# Patient Record
Sex: FEMALE | Race: AMERICAN INDIAN OR ALASKA NATIVE
[De-identification: names, ages, dates, MRNs, and addresses within clinical notes are randomized per-mention and may not be internally consistent; named-entity substitution may affect disease eponyms.]

---

## 2019-03-15 ENCOUNTER — HOSPITAL ENCOUNTER (EMERGENCY)
Dept: HOSPITAL 5 - ED | Age: 24
Discharge: HOME | End: 2019-03-15
Payer: COMMERCIAL

## 2019-03-15 VITALS — SYSTOLIC BLOOD PRESSURE: 136 MMHG | DIASTOLIC BLOOD PRESSURE: 71 MMHG

## 2019-03-15 DIAGNOSIS — M25.572: Primary | ICD-10-CM

## 2019-03-15 DIAGNOSIS — M25.472: ICD-10-CM

## 2019-03-15 PROCEDURE — 99283 EMERGENCY DEPT VISIT LOW MDM: CPT

## 2019-03-15 PROCEDURE — 96372 THER/PROPH/DIAG INJ SC/IM: CPT

## 2019-03-15 PROCEDURE — 73600 X-RAY EXAM OF ANKLE: CPT

## 2019-03-15 NOTE — XRAY REPORT
The left ankle:



Pain.



There is mild edema surrounding both medial and lateral portions of the 

ankle. There is no fracture and no displacement noted. The joint spaces 

are preserved. The bones are well-mineralized.



Impression:



Nonspecific swelling.

## 2019-03-15 NOTE — EMERGENCY DEPARTMENT REPORT
ED Lower Extremity HPI





- General


Chief Complaint: Extremity Problem,Nontraumatic


Stated Complaint: LEFT ANKLE PAIN


Time Seen by Provider: 03/15/19 10:44


Source: patient


Mode of arrival: Ambulatory


Limitations: No Limitations





- History of Present Illness


Initial Comments: 





24-year-old female with a past medical history of obesity and PCOS currently on 

metformin presents to the hospital with complains of left ankle pain and 

swelling 2 days.  Pain is swelling is at the lateral ankle, read as 7/10 

intensity, constant, worsened movement and palpation.  Patient denies any recent

fall or trauma.  She has been having intermittent ankle pain since accidentally 

twisted her ankle when year ago but has never had swelling.  She works on her 

feet all day long.  No complaints of fever.  Patient states that she had blood 

work performed at a clinic to rule out arthritis but never followed up





- Related Data


                                  Previous Rx's











 Medication  Instructions  Recorded  Last Taken  Type


 


Ibuprofen [Motrin] 800 mg PO Q8HR PRN #30 tablet 03/15/19 Unknown Rx


 


traMADol [Ultram 50 MG tab] 50 mg PO Q6HR PRN #20 tablet 03/15/19 Unknown Rx











                                    Allergies











Allergy/AdvReac Type Severity Reaction Status Date / Time


 


No Known Allergies Allergy   Unverified 03/15/19 10:10














ED Review of Systems


ROS: 


Stated complaint: LEFT ANKLE PAIN


Other details as noted in HPI





Comment: All other systems reviewed and negative





ED Past Medical Hx





- Past Medical History


Additional medical history: PCOS





- Surgical History


Past Surgical History?: No





- Social History


Smoking Status: Never Smoker


Substance Use Type: None





- Medications


Home Medications: 


                                Home Medications











 Medication  Instructions  Recorded  Confirmed  Last Taken  Type


 


Ibuprofen [Motrin] 800 mg PO Q8HR PRN #30 tablet 03/15/19  Unknown Rx


 


traMADol [Ultram 50 MG tab] 50 mg PO Q6HR PRN #20 tablet 03/15/19  Unknown Rx














ED Physical Exam





- General


Limitations: No Limitations





- Other


Other exam information: 





General: No limitations, patient is alert in no acute distress


Head exam: Atraumatic, normocephalic


Eyes exam: Normal appearance


ENT: Moist mucous membrane


Neck exam: Normal inspection


Respiratory exam: Clear to auscultation bilateral, no wheezes, rales, crackles


Cardiovascular: Normal rate and rhythm, normal heart sounds


Abdomen: Soft, nondistended, and  nontender, with normal bowel sounds, no 

rebound, or guarding


Extremity: Full range of motion, left lateral ankle swelling and tenderness at 

the ankle inferior to the lateral malleolus.  No warmth or erythema.  2+ DP 

pulse.


Back: Normal Inspection, full range of motion, no tenderness


Neurologic: Alert, oriented x3, cranial nerves intact, no motor or sensory 

deficit


Psychiatric: normal affect, normal mood


Skin: Warm, dry, intact





ED Course


                                   Vital Signs











  03/15/19 03/15/19





  10:10 11:09


 


Temperature 98 F 


 


Pulse Rate 105 H 


 


Respiratory 16 18





Rate  


 


Blood Pressure 136/71 


 


O2 Sat by Pulse 95 





Oximetry  














ED Lower Extremity MDM





- Radiology Data


Radiology results: report reviewed





The left ankle: 





Pain. 





There is mild edema surrounding both medial and lateral portions of the 


ankle. There is no fracture and no displacement noted. The joint spaces 


are preserved. The bones are well-mineralized. 





Impression: 





Nonspecific swelling. 








- Medical Decision Making





no fxt or infection


 nsaids/tramadol


d/c with f/u





- Differential Diagnosis


fracture, contusion, sprain, arthritis, cellulitis


Critical Care Time: No


Critical care attestation.: 


If time is entered above; I have spent that time in minutes in the direct care 

of this critically ill patient, excluding procedure time.








ED Disposition


Clinical Impression: 


 Left ankle swelling





Disposition: DC-01 TO HOME OR SELFCARE


Is pt being admited?: No


Does the pt Need Aspirin: No


Condition: Stable


Instructions:  Ankle Sprain (ED)


Additional Instructions: 


Take the medication as prescribed.  Follow up with your doctor or the 

clinic/doctor provided.  Return if symptoms worsen as indicated by your 

discharge instructions


Prescriptions: 


Ibuprofen [Motrin] 800 mg PO Q8HR PRN #30 tablet


 PRN Reason: Pain, Moderate (4-6)


traMADol [Ultram 50 MG tab] 50 mg PO Q6HR PRN #20 tablet


 PRN Reason: Pain


Referrals: 


RAJANI GUTIERREZ MD [Staff Physician] - 3-5 Days


[facility] [Provider Group] - 3-5 Days


Forms:  Work/School Release Form(ED)


Time of Disposition: 12:14